# Patient Record
Sex: MALE | URBAN - METROPOLITAN AREA
[De-identification: names, ages, dates, MRNs, and addresses within clinical notes are randomized per-mention and may not be internally consistent; named-entity substitution may affect disease eponyms.]

---

## 2022-01-21 NOTE — PROGRESS NOTES
[facility]  [address], [address]  Phone: [phone]  Fax: [phone]        INITIAL CONSULTATION      HISTORY OF PRESENT ILLNESS:  Mike Slade is a 37 y.o. male whom is self referred secondary to left sided low back pain x 7 years. He rates his pain 0-6/10. Pt reports his pain is worse with transitioning from sit to stand, sit ups, touching his toes. Pt reports he had been playing with his children and had been tossing them onto a couch with a sudden onset of back pain 7 years ago. Pt admits he had received chiropractic treatment with good benefit, back pain had resolved for several years. He noted reoccurance of back pain 4 years ago without injury. He states recently his pain was aggravated with shoveling snow but denies flareups, states his pain is constant in nature. Low back pain is aggravated with activity. Pt admits to taking Motrin prn without relief. Denies previous use of Prednisone. Pt admits to receiving recent chiropractic treatment in 6/2021 without benefit and physical therapy in 3/2021 without relief. He is noncompliant with his HEP as he found no benefit with preforming his back exercises. Patient denies previous spinal surgery or injections. Pt denies change in bowel or bladder habits. Pt denies fever, weight loss, or skin changes. No significant PmHx. Pt is a nicotine user, smokeless tobacco. Lumbar spine MRI dated 5/12/2021 films independently reviewed. Per report, L4-L5 moderate degenerative disc and endplate changes with mild levoscoliosis and mild central canal narrowing. L5-S1 and L3-L4 mild degenerative changes without stenosis. The patient is RHD.  reviewed. There is no height or weight on file to calculate BMI. PCP: No primary care provider on file. No past medical history on file. No past surgical history on file.     Social History     Tobacco Use    Smoking status: Not on file    Smokeless tobacco: Not on file   Substance Use Topics    Alcohol use: Not on file       Work status: N/A  Marital status: N/A          Not on File       No family history on file. REVIEW OF SYSTEMS  Constitutional symptoms: Negative  Eyes: Negative  Ears, Nose, Throat, and Mouth: Negative  Cardiovascular: Negative  Respiratory: Negative  Genitourinary: Negative  Integumentary (Skin and/or breast): Negative  Musculoskeletal: Positive for low back  Extremities: Negative for edema. Endocrine/Rheumatologic: Negative  Hematologic/Lymphatic: Negative  Allergic/Immunologic: Negative  Psychiatric: Negative       PHYSICAL EXAMINATION  There were no vitals taken for this visit. CONSTITUTIONAL: NAD, A&O x 3  HEART: Regular rate and rhythm  GASTROINTESTINAL: Positive bowel sounds, soft, nontender, and nondistended  LUNGS: Clear to auscultation bilaterally. SKIN: Negative for rash. RANGE OF MOTION: The patient has full passive range of motion in all four extremities. SENSATION:sensation is intact to light touch throughout. MOTOR:   Straight Leg Raise: Negative, bilateral  Anand: Negative, bilateral  Tandem Gait: Neg. Deep tendon reflexes are trace at the biceps, trace  at the brachioradialis and 0 at the triceps. Deep tendon reflexes are trace at the knees and 1 at the ankles bilaterally. Shoulder AB/Flex Elbow Flex Wrist Ext Elbow Ext Wrist Flex Hand Intrin Tone   Right +4/5 +4/5 +4/5 +4/5 +4/5 +4/5 +4/5   Left +4/5 +4/5 +4/5 +4/5 +4/5 +4/5 +4/5              Hip Flex Knee Ext Knee Flex Ankle DF GTE Ankle PF Tone   Right +4/5 +4/5 +4/5 +4/5 +4/5 +4/5 +4/5   Left +4/5 +4/5 +4/5 +4/5 +4/5 +4/5 +4/5       ASSESSMENT   Diagnoses and all orders for this visit:    1. Lumbar pain    2. DDD (degenerative disc disease), lumbar       IMPRESSIONS/RECOMMENDATIONS:  Patient presents today with c/o left sided low back pain. Multiple treatment options were discussed. I will start him on Medrol Dosepak followed by Mobic.  I recommended he increase the frequency of HEP to daily. Pt is interested in pursuing additional PT in [facility], may refer him back to PT next office visit. Patient is neurologically intact. I will see the patient back in 1 month's time or earlier if needed. Written by Romaine Singleton, as dictated by Heidy Rodney MD  I examined the patient, reviewed and agree with the note.

## 2022-01-24 ENCOUNTER — OFFICE VISIT (OUTPATIENT)
Dept: ORTHOPEDIC SURGERY | Age: 42
End: 2022-01-24
Payer: OTHER GOVERNMENT

## 2022-01-24 VITALS
BODY MASS INDEX: 28.67 KG/M2 | WEIGHT: 235.4 LBS | HEIGHT: 76 IN | HEART RATE: 69 BPM | OXYGEN SATURATION: 99 % | TEMPERATURE: 97.8 F

## 2022-01-24 DIAGNOSIS — M51.36 DDD (DEGENERATIVE DISC DISEASE), LUMBAR: ICD-10-CM

## 2022-01-24 DIAGNOSIS — M54.50 LUMBAR PAIN: Primary | ICD-10-CM

## 2022-01-24 PROCEDURE — 99204 OFFICE O/P NEW MOD 45 MIN: CPT | Performed by: PHYSICAL MEDICINE & REHABILITATION

## 2022-01-24 RX ORDER — IBUPROFEN 200 MG
200 TABLET ORAL
COMMUNITY

## 2022-01-24 RX ORDER — MELOXICAM 15 MG/1
15 TABLET ORAL DAILY
Qty: 15 TABLET | Refills: 0 | Status: SHIPPED | OUTPATIENT
Start: 2022-01-24

## 2022-01-24 RX ORDER — METHYLPREDNISOLONE 4 MG/1
TABLET ORAL
Qty: 1 DOSE PACK | Refills: 0 | Status: SHIPPED | OUTPATIENT
Start: 2022-01-24

## 2022-01-24 RX ORDER — IBUPROFEN 800 MG/1
800 TABLET ORAL
Qty: 90 TABLET | Refills: 0 | Status: CANCELLED | OUTPATIENT
Start: 2022-01-24

## 2022-02-18 NOTE — PROGRESS NOTES
[facility]  [address], [place] [address]  Phone: [phone]  Fax: [phone]        PROGRESS NOTE      HISTORY OF PRESENT ILLNESS:  The patient is a 37 y.o. male and was seen today for follow up of left sided low back pain x 7 years. Pt reports his pain is worse with transitioning from sit to stand, w/ sit ups, and touching his toes. Pt reports he had been playing with his children and had been tossing them onto a couch with a sudden onset of back pain 7 years ago. Pt admits he had received chiropractic treatment with good benefit, back pain had resolved for several years. He noted reoccurance of back pain 4 years ago without injury. He states recently his pain was aggravated with shoveling snow but denies flareups, states his pain is constant in nature. Low back pain is aggravated with activity. Pt admits to taking Motrin prn without relief. Denies previous use of Prednisone. Pt admits to receiving recent chiropractic treatment in 6/2021 without benefit and physical therapy in 3/2021 without relief. He is noncompliant with his HEP as he found no benefit with preforming his back exercises. Patient denies previous spinal surgery or injections. Pt denies change in bowel or bladder habits. Pt denies fever, weight loss, or skin changes. The patient is RHD. No significant PmHx. Pt is a nicotine user, smokeless tobacco. Lumbar spine MRI dated 5/12/2021 films independently reviewed. Per report, L4-L5 moderate degenerative disc and endplate changes with mild levoscoliosis and mild central canal narrowing. L5-S1 and L3-L4 mild degenerative changes without stenosis. At his last clinical appointment, I started him on Medrol Dosepak followed by Mobic. I recommended he increase the frequency of HEP to daily. Pt was interested in pursuing additional PT in [facility], may refer him back to PT next office visit.        The patient returns today with left sided low back pain w/ left groin pain. He rates his pain 6/10, previously 0-6/10. Pt reports temporary relief w/ MDP. He reports onset of left groin pain after completing MDP. He notes aggravation w/ getting in and out of a car. He is compliant with his HEP. Pt denies change in bowel or bladder habits.  reviewed. Body mass index is 29.38 kg/m². PCP: Unknown, Provider, DPM      History reviewed. No pertinent past medical history. Social History     Socioeconomic History    Marital status: UNKNOWN     Spouse name: Not on file    Number of children: Not on file    Years of education: Not on file    Highest education level: Not on file   Occupational History    Not on file   Tobacco Use    Smoking status: Never Smoker    Smokeless tobacco: Current User     Types: Chew   Substance and Sexual Activity    Alcohol use: Never    Drug use: Never    Sexual activity: Not on file   Other Topics Concern    Not on file   Social History Narrative    Not on file     Social Determinants of Health     Financial Resource Strain:     Difficulty of Paying Living Expenses: Not on file   Food Insecurity:     Worried About Running Out of Food in the Last Year: Not on file    Quin of Food in the Last Year: Not on file   Transportation Needs:     Lack of Transportation (Medical): Not on file    Lack of Transportation (Non-Medical):  Not on file   Physical Activity:     Days of Exercise per Week: Not on file    Minutes of Exercise per Session: Not on file   Stress:     Feeling of Stress : Not on file   Social Connections:     Frequency of Communication with Friends and Family: Not on file    Frequency of Social Gatherings with Friends and Family: Not on file    Attends Christianity Services: Not on file    Active Member of Clubs or Organizations: Not on file    Attends Club or Organization Meetings: Not on file    Marital Status: Not on file   Intimate Partner Violence:     Fear of Current or Ex-Partner: Not on file   [facility] Emotionally Abused: Not on file    Physically Abused: Not on file    Sexually Abused: Not on file   Housing Stability:     Unable to Pay for Housing in the Last Year: Not on file    Number of Places Lived in the Last Year: Not on file    Unstable Housing in the Last Year: Not on file       Current Outpatient Medications   Medication Sig Dispense Refill    aspirin-acetaminophen-caffeine (EXCEDRIN ES) 250-250-65 mg per tablet Take 1 Tablet by mouth.  ibuprofen (Motrin IB) 200 mg tablet Take 200 mg by mouth every six (6) hours as needed for Pain.  methylPREDNISolone (MEDROL DOSEPACK) 4 mg tablet Per dose pack instructions 1 Dose Pack 0    meloxicam (MOBIC) 15 mg tablet Take 1 Tablet by mouth daily. 15 Tablet 0       Not on File       PHYSICAL EXAMINATION    Visit Vitals  Pulse 69   Temp 98.3 °F (36.8 °C) (Temporal)   Resp 18   Ht 6' 4\" (1.93 m)   Wt 241 lb 6.4 oz (109.5 kg)   SpO2 99%   BMI 29.38 kg/m²       CONSTITUTIONAL: NAD, A&O x 3  SENSATION: Intact to light touch throughout  RANGE OF MOTION: The patient has full passive range of motion in all four extremities. MOTOR:  Straight Leg Raise: Negative, bilateral    Cb Test: Left, Negative    No increase in tenderness w/ direct palpation of the left SI Joint    Increase in left groin pain w/ internal rotation of the LLE               Hip Flex Knee Ext Knee Flex Ankle DF GTE Ankle PF Tone   Right +4/5 +4/5 +4/5 +4/5 +4/5 +4/5 +4/5   Left +4/5 +4/5 +4/5 +4/5 +4/5 +4/5 +4/5       ASSESSMENT   Diagnoses and all orders for this visit:    1. Lumbar pain    2. DDD (degenerative disc disease), lumbar    3. Left hip pain    4. Left groin pain      IMPRESSION AND PLAN:  Patient returns to the office today with c/o left sided low back pain w/ left sided groin pain. Multiple treatment options were discussed. I suspect his pain complaints to be multifactorial in nature, some related to hip pathology and some to spinal pathology.  I offered blocks, pt wished to proceed. I will order an epidural at L4-5. I will refer him to Dr. Sonali Ya for his left hip/groin pain. I encouraged him to continue to perform his daily HEP. Patient is neurologically intact. I will see the patient back following block or earlier if needed. Written by Gloria Schaffer, as dictated by Jun Trammell MD  I examined the patient, reviewed and agree with the note.

## 2022-02-21 ENCOUNTER — OFFICE VISIT (OUTPATIENT)
Dept: ORTHOPEDIC SURGERY | Age: 42
End: 2022-02-21
Payer: OTHER GOVERNMENT

## 2022-02-21 VITALS
OXYGEN SATURATION: 99 % | HEART RATE: 69 BPM | WEIGHT: 241.4 LBS | HEIGHT: 76 IN | RESPIRATION RATE: 18 BRPM | TEMPERATURE: 98.3 F | BODY MASS INDEX: 29.4 KG/M2

## 2022-02-21 DIAGNOSIS — M25.552 LEFT HIP PAIN: ICD-10-CM

## 2022-02-21 DIAGNOSIS — M54.50 LUMBAR PAIN: Primary | ICD-10-CM

## 2022-02-21 DIAGNOSIS — R10.32 LEFT GROIN PAIN: ICD-10-CM

## 2022-02-21 DIAGNOSIS — M51.36 DDD (DEGENERATIVE DISC DISEASE), LUMBAR: ICD-10-CM

## 2022-02-21 NOTE — Clinical Note
2/21/2022    Patient: Suzie Key   YOB: 1980   Date of Visit: 2/21/2022     Dea Grant, [address] [address]  [place]     Dear Darek Mo. Kelli Grant RN,      Thank you for referring Mr. Suzie Key to [address] for evaluation. My notes for this consultation are attached. If you have questions, please do not hesitate to call me. I look forward to following your patient along with you.       Sincerely,    Radha Snow MD